# Patient Record
Sex: FEMALE | Race: OTHER | HISPANIC OR LATINO | URBAN - METROPOLITAN AREA
[De-identification: names, ages, dates, MRNs, and addresses within clinical notes are randomized per-mention and may not be internally consistent; named-entity substitution may affect disease eponyms.]

---

## 2017-08-05 ENCOUNTER — EMERGENCY (EMERGENCY)
Facility: HOSPITAL | Age: 22
LOS: 1 days | Discharge: HOME | End: 2017-08-05
Attending: EMERGENCY MEDICINE
Payer: MEDICAID

## 2017-08-05 VITALS
SYSTOLIC BLOOD PRESSURE: 109 MMHG | HEART RATE: 79 BPM | OXYGEN SATURATION: 98 % | WEIGHT: 149.91 LBS | TEMPERATURE: 99 F | HEIGHT: 61 IN | RESPIRATION RATE: 16 BRPM | DIASTOLIC BLOOD PRESSURE: 62 MMHG

## 2017-08-05 DIAGNOSIS — J45.909 UNSPECIFIED ASTHMA, UNCOMPLICATED: ICD-10-CM

## 2017-08-05 DIAGNOSIS — J06.9 ACUTE UPPER RESPIRATORY INFECTION, UNSPECIFIED: ICD-10-CM

## 2017-08-05 PROCEDURE — 99284 EMERGENCY DEPT VISIT MOD MDM: CPT | Mod: 25

## 2017-08-05 NOTE — ED ADULT NURSE NOTE - OBJECTIVE STATEMENT
[facility] [address]    [address]    [address]    [facility] [address]    Phone:  [phone]    Fax:  [phone]       Thank You for choosing us for your health care visit. We are glad to serve you and happy to provide you with this summary of your visit. Please help us to ensure we have accurate records. If you find anything that needs to be changed, please let our staff know as soon as possible.          Your Demographic Information     Patient Name Sex Jorge Hines Male 1939       Ethnic Group Patient Race    Not of  or  Origin White      Your Visit Details     Date & Time Provider Department    2017 9:30 AM M ACUTE CARE [address] [facility] [address]      Your Upcoming Appointment*(Max 10)     Monday April 10, 2017  1:00 PM CDT   Follow-up Visit with ELPIDIO ANTICOAG SERVICE   [facility] ([place]    [address]  [facility] [address]   [phone]            Tuesday May 02, 2017  9:30 AM CDT   Lab Visit with ELPIDIO LAB   [facility] ([place])    [address]  [place] [id number] [phone]            Tuesday May 09, 2017 10:20 AM CDT   Follow-up Visit with Bruno Donnelly MD   [facility] ([place])    [address]  [place] [address]   [phone]            2017  8:30 AM CDT   Pacer check office with Edward Amaro MD, [facility] ELECTRO 777 DEVICE CHECK   [facility] [address] ([facility])    [address]  [address]  [facility] [address]   [phone]              9:30 AM CDT   Follow-up Visit with Evert Kiran DPM   [facility] ([place])    [address]  [place] [address]      [phone]            Thursday September 07, 2017 10:15 AM CDT   Office Visit with Federico Romero MD   [facility] [address] ([facility])    [address]  [address]  [facility] [address]   [phone]              Your Upcoming Home Remote Device Check     Wednesday September 20, 2017  7:45 AM CDT   [facility]   [facility] [address] [facility]    [address]  [address]  [facility] [address]   [phone]              Your Vitals Were     BP Pulse Temp Resp Height Weight    133/76 (BP Location: [facility], Patient Position: Sitting, Cuff Size: Regular) 73 98.2 °F (36.8 °C) (Oral) 18 6' 4\" (1.93 m) 174 lb 1.6 oz (79 kg)    BMI Smoking Status                21.19 kg/m2 Former Smoker          Medications Prescribed or Re-Ordered Today     None      Your Current Medications Are        Disp Refills Start End    ipratropium (ATROVENT) 0.03 % nasal spray 30 mL 1 3/15/2017     Sig: INSTILL TWO SPRAYS IN EACH NOSTRIL DAILY AS NEEDED    Class: Eprescribe    warfarin (COUMADIN) 7.5 MG tablet 90 tablet 0 2/13/2017     Sig: Take 1 tab by mouth daily six days per week and take 1.5 tabs ( 11.25mg) on Fridays or as directed.    Class: Eprescribe    sertraline (ZOLOFT) 100 MG tablet 30 tablet 5 2/9/2017     Sig - Route: Take 1 tablet by mouth daily. Dose increased on 8/3/16. - Oral    Class: Eprescribe    atorvastatin (LIPITOR) 10 MG tablet 45 tablet 1 11/14/2016     Sig: TAKE 0.5 TABLETS BY MOUTH NIGHTLY.    Class: Eprescribe    metFORMIN (GLUCOPHAGE) 500 MG tablet 180 tablet 1 11/14/2016     Sig: TAKE 1 TABLET BY MOUTH 2 TIMES DAILY (WITH MEALS).    Class: Eprescribe    fluorouracil (EFUDEX) 5 % cream 40 g 2 9/15/2016     Sig: Apply to lesions 1 x daily    Class: Eprescribe    famotidine (PEPCID) 40 MG tablet 30 tablet 12 8/3/2016     Sig - Route: Take 1 tablet  by mouth daily. - Oral    Class: Script Not Printed    blood glucose test strips (ACCU-CHEK GATITO) 100 strip 2 7/12/2016     Sig: Test blood sugar 2 times daily as directed. Diagnosis: E11.9. Meter:    Class: Eprescribe    cyanocobalamin 100 MCG tablet 90 tablet 1 5/10/2016     Sig - Route: Take 1 tablet by mouth daily. - Oral    Class: Eprescribe    docusate sodium-sennosides (SENOKOT S) 50-8.6 MG per tablet 60 tablet 0 2/12/2016     Sig - Route: Take 2 tablets by mouth daily as needed for Constipation. - Oral    Class: Eprescribe    acetaminophen (TYLENOL) 500 MG tablet        Sig - Route: Take 1,000 mg by mouth every 6 hours as needed for Pain. 2 tabs (=1,000mg) - Oral    Class: Historical Med    nystatin (MYCOSTATIN) powder        Sig - Route: Apply 1 application topically daily as needed. Indications: redness Apply to affected areas. - Topical    Class: Historical Med    Polyethyl Glycol-Propyl Glycol (SYSTANE) 0.4-0.3 % SOLN        Sig - Route: Place 1 drop into both eyes as needed. Indications: dry eyes  - Both Eyes    Class: Historical Med    nitroGLYcerin (NITROSTAT) 0.4 MG SL tablet        Sig - Route: Place 0.4 mg under the tongue every 5 minutes as needed for Chest pain.  - Sublingual    Class: Historical Med      Allergies     Demerol     Oxycodone Other (See Comments)    Chest pain    Vicodin [Hydrocodone-acetaminophen] Other (See Comments)    Chest pain      Immunizations History as of 4/6/2017     Name Date    HERPES ZOSTER SHINGLES 10/20/2014    Influenza 9/1/2016, 10/22/2015, 9/30/2014, 9/16/2013, 9/6/2012, 9/22/2011, 9/16/2010, 9/15/2009, 10/7/2008, 11/3/2007, 11/4/2006, 11/5/2005, 10/21/2004, 10/22/2003, 11/11/2002    Influenza A novel H1N1 12/15/2009    Influenza Quadrivalent Preservative Free 9/1/2016    Pneumococcal Conjugate 13 Valent 10/22/2015    Pneumococcal Polysaccharide Adult 6/1/2003    Td:Adult type tetanus/diphtheria 5/20/2004    Tdap 1/24/2016      Problem List as of 4/6/2017      Other and unspecified hyperlipidemia    Callus of foot    Essential hypertension, benign    Warfarin anticoagulation    GERD (gastroesophageal reflux disease)    Fatigue    Adjustment disorder with depressed mood    Paroxysmal atrial fibrillation    Complete heart block    History of tobacco use    Coronary atherosclerosis of native coronary artery    Long term current use of anticoagulant, on warfarin    Anxiety    Traumatic subdural hematoma    Presence of permanent cardiac pacemaker, dual Medtronic 1/2016( [facility])    Type 2 diabetes mellitus without complication, without long-term current use of insulin    Diabetic peripheral neuropathy associated with type 2 diabetes mellitus    Hammer toes of both feet            Patient Instructions     None       presents to ed  with  sorethroat.medicated  with  tylenol #3

## 2017-08-06 PROCEDURE — 99283 EMERGENCY DEPT VISIT LOW MDM: CPT

## 2017-08-06 RX ORDER — ACETAMINOPHEN WITH CODEINE 300MG-30MG
1 TABLET ORAL ONCE
Qty: 0 | Refills: 0 | Status: DISCONTINUED | OUTPATIENT
Start: 2017-08-06 | End: 2017-08-06

## 2017-08-06 RX ADMIN — Medication 1 TABLET(S): at 01:00

## 2017-08-06 RX ADMIN — Medication 1 TABLET(S): at 00:07

## 2017-08-06 NOTE — ED PROVIDER NOTE - OBJECTIVE STATEMENT
22 y/o F pt with PMHx of Asthma and no significant PSHx presents to ED c/o cough x1 week. Pt describes cough as predominantly dry, with minimal production of white sputum. Pt also reports initial runny nose, sore throat, and congestion. Pt notes sick contacts at home. Pt denies CP, SOB, fever, chills, abd pain, nausea, vomiting, diarrhea, dysuria, hematuria, urinary frequency, difficulty urinating, or any other complaints. NKDA.

## 2017-08-06 NOTE — ED PROVIDER NOTE - CHPI ED SYMPTOMS NEG
no nausea/no vomiting/no fever/no chest pain, no shortness of breath, no abd pain, no diarrhea, no dysuria, no hematuria, no urinary frequency, no difficulty urinating/no chills

## 2017-08-06 NOTE — ED PROVIDER NOTE - CONSTITUTIONAL, MLM
normal... Well appearing, well nourished, awake, alert, oriented to person, place, time/situation and in no apparent distress. Vital signs WNL.

## 2018-12-05 NOTE — ED ADULT NURSE NOTE - NS ED NURSE LEVEL OF CONSCIOUSNESS ORIENTATION
Bedside and Verbal shift change report given to Cindy RN (oncoming nurse) by Juan F Jones RN  (offgoing nurse). Report included the following information SBAR, Intake/Output, MAR, Recent Results, Med Rec Status and Cardiac Rhythm SR-ST. 0800: Patient refused all po medications. Dr Lakesha Hernandez made aware. 1400: Dr Lakesha Hernandez notified of elevated BP. Oriented - self; Oriented - place; Oriented - time

## 2019-12-22 NOTE — ED ADULT NURSE NOTE - CHIEF COMPLAINT
The patient is a 21y Female complaining of sore throat. Pursed lip breathing, speaking in full sentences. Wheezing in L lung.

## 2020-04-18 NOTE — ED PROVIDER NOTE - DISPOSITION TYPE
EMERGENCY DEPARTMENT ENCOUNTER        PCP: Karson Gan MD    CHIEF COMPLAINT    Chief Complaint   Patient presents with    Laceration     left forearm self inflicted    Hand Injury     right, punched fridge     Mental Health Problem       Of note, this patient was also evaluated by the attending physician, Dr. Michael Odell. HPI    Flo Peters is a 11 y.o. female who presents with mother for right hand pain and self-inflicted wounds of forearm. Context is patient reports that she became upset today and \"blacked out \"and when she came to she relates she had punched a refrigerator with her right hand and cut her left forearm several times with a plastic knife. Injury occurred just prior to arrival.  Patient then went to a friend's house where bleeding was able to be controlled and eventually patient's mother was contacted and brought to the ED. Patient's mother is concerned for patient as she reports that patient recently broke up with her boyfriend and has history of depression in the past.  Patient has not established with mental health. Patient does live between her mother's and her father's house. Father does own guns but they are kept in a gun safe. Patient reports that she feels very sad lately and feels as though she has been struggling with depression for the past several months. Patient's mother reports patient is up-to-date on all childhood immunizations. History obtained by patient and patient's mother at bedside due to patient's age. Patient denies homicidal ideations, suicidal ideations, auditory hallucinations, visual hallucinations. Patient denies distal numbness, tingling, weakness, functional/motor deficit. Patient denies foreign body sensation. REVIEW OF SYSTEMS    General:   Denies symptoms preceding injury. Denies syncope. Skin: + Lacerations/abrasions. See HPI. Musculoskeletal:  + right hand pain; No distal numbness, tingling.   No obvious tendon or motor deficits. Denies any other musculoskeletal injuries. Psychiatric: See HPI    All other review of systems are negative  See HPI and nursing notes for additional information     PAST MEDICAL & SURGICAL HISTORY    History reviewed. No pertinent past medical history. History reviewed. No pertinent surgical history. CURRENT MEDICATIONS    Current Outpatient Rx   Medication Sig Dispense Refill    ibuprofen (ADVIL;MOTRIN) 400 MG tablet Take 1 tablet by mouth every 6 hours as needed for Pain 30 tablet 0    acetaminophen (APAP EXTRA STRENGTH) 500 MG tablet Take 1 tablet by mouth every 6 hours as needed for Pain 30 tablet 0       ALLERGIES    No Known Allergies    SOCIAL & FAMILY HISTORY    Social History     Socioeconomic History    Marital status: Single     Spouse name: None    Number of children: None    Years of education: None    Highest education level: None   Occupational History    None   Social Needs    Financial resource strain: None    Food insecurity     Worry: None     Inability: None    Transportation needs     Medical: None     Non-medical: None   Tobacco Use    Smoking status: Passive Smoke Exposure - Never Smoker    Smokeless tobacco: Never Used   Substance and Sexual Activity    Alcohol use: No    Drug use: No    Sexual activity: Never   Lifestyle    Physical activity     Days per week: None     Minutes per session: None    Stress: None   Relationships    Social connections     Talks on phone: None     Gets together: None     Attends Yazidi service: None     Active member of club or organization: None     Attends meetings of clubs or organizations: None     Relationship status: None    Intimate partner violence     Fear of current or ex partner: None     Emotionally abused: None     Physically abused: None     Forced sexual activity: None   Other Topics Concern    None   Social History Narrative    None     History reviewed. No pertinent family history.         PHYSICAL EXAM horizontally oriented without obvious tendon involvement or foreign body on initial inspection. Distal sensation, capillary refill intact. Distal joints with full range of motion. See below for further details. Neurologic:  Awake and alert, normal flow of speech. CN 2-12 intact. Bilateral upper extremities distally neurovascular intact. Psychiatric: Cooperative, pleasant affect. Makes good eye contact. Denies homicidal and suicidal ideations. Denies auditory and visual hallucinations. Coherent thought process. LABS:  Results for orders placed or performed during the hospital encounter of 04/18/20   Ethanol   Result Value Ref Range    Alcohol Scrn <0.01  THE VALUE IS BELOW OUR DETECTION LIMIT. <0.01 %WT/VOL   Acetaminophen Level   Result Value Ref Range    Acetaminophen Level <5.0 (L) 15 - 30 ug/ml    DOSE AMOUNT       DOSE AMT. GIVEN - Unknown  DOSE AMT. GIVEN -  patient denies ingestion      DOSE TIME       DOSE TIME GIVEN - Unknown  DOSE TIME GIVEN - patient denies ingestion     Salicylate   Result Value Ref Range    Salicylate Lvl 0.2 (L) 15 - 30 MG/DL    DOSE AMOUNT       DOSE AMT. GIVEN - Unknown  DOSE AMT.  GIVEN -  patient denies ingestion      DOSE TIME       DOSE TIME GIVEN - Unknown  DOSE TIME GIVEN - patient denies ingestion     TSH without Reflex   Result Value Ref Range    TSH, High Sensitivity 1.660 0.270 - 4.20 uIu/ml   Urine Drug Screen   Result Value Ref Range    Cannabinoid Scrn, Ur UNCONFIRMED POSITIVE (A) NEGATIVE    Amphetamines NEGATIVE NEGATIVE    Cocaine Metabolite NEGATIVE NEGATIVE    Benzodiazepine Screen, Urine NEGATIVE NEGATIVE    Barbiturate Screen, Ur NEGATIVE NEGATIVE    Opiates, Urine NEGATIVE NEGATIVE    Phencyclidine, Urine NEGATIVE NEGATIVE    Oxycodone  NEGATIVE     NEGATIVE          THRESHOLD CONCENTRATIONS (mg/dL)  AMPHT               1000  LEA,OPIA             300  BZO,BAR              200  PCP                   25  THC                   50  OXY does not require a booster. I discussed possibility of infection, retained foreign body, tendon injury, nerve injury. I discussed wound care instructions today with patient and/or family. Patient and/or family agrees to having a wound check in 2 days and suture/staple removal in 7-10 days (which we discussed today). We also discussed scars and ways to minimize scarring including but not limited to protection of scar from sunlight for one year and massaging of scar tissue. All pertinent Lab data and radiographic results reviewed with patient at bedside. The patient and/or the family were informed of the results of any tests/labs/imaging, the treatment plan, and time was allotted to answer questions. Disposition, diagnosis, and plan discussed at bedside with patient and/or the family today who understands and agrees to placement at [facility] facility at this time for further care. 2100: My shift has come to an end-please see supervising physician's note for location of where patient will be placed at [facility] facility as well as further ED course. Clinical  IMPRESSION    1. Self-inflicted injury    2. Contusion of right hand, initial encounter    3. Forearm laceration, left, initial encounter    4. Abrasion of left forearm, initial encounter         Disposition:  Transfer to [address]    Comment: Please note this report has been produced using speech recognition software and may contain errors related to that system including errors in grammar, punctuation, and spelling, as well as words and phrases that may be inappropriate. If there are any questions or concerns please feel free to contact the dictating provider for clarification.         Blanca Bates PA-C  04/18/20 7073 DISCHARGE

## 2021-07-24 NOTE — ED PROVIDER NOTE - DISCUSSED CLINICAL AND RADIOLOGICAL FINDINGS WITH, MDM
"-- DO NOT REPLY / DO NOT REPLY ALL --  -- Message is from the [employer]--    COVID-19 Universal Screening: N/A - Not about scheduling    General Patient Message      Reason for Call: pt has medication clarification regarding her vitamin d , she was originally taking it once a week since October , and she ran out and called for a refill but her pharmacy told her that she should of been taking it once a month , and she is confused needs to talk to the      [facility] Phone    07/24/2021 11:58 AM CDT Phone (Incoming) Nuvia Roma (Self) [phone] (M)          Alternative phone number: na        Did the caller agree that this message can wait until the office reopens on Monday (or after the holiday)? YES - The Message Can Wait      Send a message to the provider's clinical support pool. Turnaround time given to caller: ""This message will be sent to [facility] Provider's name]. The clinical team will fulfill your request as soon as they review your message when the office opens on Monday (or after the holiday). \""      " patient

## 2024-12-30 NOTE — ED PROVIDER NOTE - NS ED MD EM SELECTION
Nutrition Assessment   Reason for consult/assessment: Initial    Diagnosis, Labs, Medication, History: Reviewed    Pertinent nutrition history prior to admission: Patient reports he eats consistently at home, three meals per day, \"I can't skip meals- I'm diabetic\". Appetite has been a bit lower than usual lately and patient reports decreased portion sizes at home. Drinking Premier Protein shakes once daily at home.    Pertinent nutrition information pertaining to hospital course: Met with patient, RN, wife. Labs: reviewed. Meds: stool softeners, insulin, PPI, lasix drip, antiemetic    Oral diet order: Low fat (50g), 2gm sodium (low sodium)    -- Nutrition supplement/snacks: Provider ordered Ensure High Protein once daily- patient does not like this, not consuming, prefers to drink his Premier from home instead.     Diet tolerance: Tolerating with good appetite/intakes recorded (100% intake of recent meals, tolerating well, no nausea, no abd pain, appetite low, + BM x 3 today)    Food allergies: None known    Anthropometrics Information  Usual weight: 109.8 kg (per patient report, dry weight recently)  % Weight change: 119.296 kg 1 year ago, 118.8 kg currently, but patient quite edematous.  Weight change significant: No    Estimated Needs  Calculated energy needs: 0101-6630  kcal   Calculated protein needs: 121-161  g   Calculated Fluid Needs: 1ml/kcal     NFPE  Nutrition Focused Physical Exam  Physical Exam Completed: Yes (12/30/24 1711 : Hannah Martin, RD)   Body Fat  Orbital region: Mild/Moderate  Muscle Mass  Overall muscle mass: Normal    Treatment Plan/Interventions   Meals & snacks: Low fat, 2 gm sodium diet    Nutrition supplement therapy: Patient to drink one Premier per day- brought in from home.    Nutrition education: Provided low fat diet education to patient and his wife. Questions answered. Handout and RD contact information provided.     Goals & Monitoring  Goal: Increase oral intake to >/equal 75%  of meals and supplements, Express comprehension of nutrition intervention  Intervention goal status: Initiated  Time frame to achieve goal: Ongoing    Dietitian will monitor: Food, beverage, and nutrient intake, Knowledge, beliefs, attitudes    Nutrition Diagnosis/PES  Nutrition Diagnosis: Inadequate oral intake     Related to: Poor appetite (pancreatitis)  As evidenced by: Diet history/recall, Documented/reported poor oral intake     Primary nutrition diagnosis status: Active nutrition diagnosis   89868 Exp Problem Focused - Mod. Complex